# Patient Record
Sex: FEMALE | Race: WHITE | NOT HISPANIC OR LATINO | RURAL
[De-identification: names, ages, dates, MRNs, and addresses within clinical notes are randomized per-mention and may not be internally consistent; named-entity substitution may affect disease eponyms.]

---

## 2017-03-01 ENCOUNTER — APPOINTMENT (RX ONLY)
Dept: RURAL CLINIC 4 | Facility: CLINIC | Age: 32
Setting detail: DERMATOLOGY
End: 2017-03-01

## 2017-03-01 DIAGNOSIS — D49.2 NEOPLASM OF UNSPECIFIED BEHAVIOR OF BONE, SOFT TISSUE, AND SKIN: ICD-10-CM

## 2017-03-01 PROBLEM — F32.9 MAJOR DEPRESSIVE DISORDER, SINGLE EPISODE, UNSPECIFIED: Status: ACTIVE | Noted: 2017-03-01

## 2017-03-01 PROBLEM — F41.9 ANXIETY DISORDER, UNSPECIFIED: Status: ACTIVE | Noted: 2017-03-01

## 2017-03-01 PROCEDURE — 99201: CPT

## 2017-03-01 PROCEDURE — ? OTHER

## 2017-03-01 ASSESSMENT — LOCATION DETAILED DESCRIPTION DERM: LOCATION DETAILED: LEFT MIDDLE FINGERNAIL

## 2017-03-01 ASSESSMENT — LOCATION SIMPLE DESCRIPTION DERM: LOCATION SIMPLE: LEFT MIDDLE FINGER

## 2017-03-01 ASSESSMENT — LOCATION ZONE DERM: LOCATION ZONE: FINGER

## 2017-03-01 NOTE — PROCEDURE: OTHER
Detail Level: Detailed
Note Text (......Xxx Chief Complaint.): This diagnosis correlates with the use of isotretinoin.
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  It typical originates in the dip joint.  Given that x-ray shows no bony abnormality and that exam shows nail dystrophy and a fullness over the proximal nail fold in the region of the nail matrix I think this lesion lies below the nail matrix and likely communicates with the DIP joint.  I advise referral to Orthopedic surgery because any surgery is likely to involve the joint which is beyond my capabilities in my office.